# Patient Record
Sex: FEMALE | Race: ASIAN | NOT HISPANIC OR LATINO | Employment: FULL TIME | URBAN - METROPOLITAN AREA
[De-identification: names, ages, dates, MRNs, and addresses within clinical notes are randomized per-mention and may not be internally consistent; named-entity substitution may affect disease eponyms.]

---

## 2019-03-22 ENCOUNTER — HOSPITAL ENCOUNTER (EMERGENCY)
Facility: CLINIC | Age: 19
Discharge: HOME | End: 2019-03-22
Attending: PHYSICIAN ASSISTANT | Admitting: PHYSICIAN ASSISTANT
Payer: MEDICAID

## 2019-03-22 ENCOUNTER — APPOINTMENT (OUTPATIENT)
Dept: GENERAL RADIOLOGY | Facility: CLINIC | Age: 19
End: 2019-03-22
Attending: PHYSICIAN ASSISTANT
Payer: MEDICAID

## 2019-03-22 VITALS
BODY MASS INDEX: 21.73 KG/M2 | HEIGHT: 67 IN | WEIGHT: 138.45 LBS | SYSTOLIC BLOOD PRESSURE: 121 MMHG | OXYGEN SATURATION: 98 % | TEMPERATURE: 98.7 F | DIASTOLIC BLOOD PRESSURE: 76 MMHG | RESPIRATION RATE: 12 BRPM

## 2019-03-22 DIAGNOSIS — R07.9 CHEST PAIN, UNSPECIFIED TYPE: ICD-10-CM

## 2019-03-22 LAB
ANION GAP SERPL CALCULATED.3IONS-SCNC: 3 MMOL/L (ref 3–14)
B-HCG SERPL-ACNC: <1 IU/L (ref 0–5)
BASOPHILS # BLD AUTO: 0 10E9/L (ref 0–0.2)
BASOPHILS NFR BLD AUTO: 0.6 %
BUN SERPL-MCNC: 13 MG/DL (ref 7–30)
CALCIUM SERPL-MCNC: 8.7 MG/DL (ref 8.5–10.1)
CHLORIDE SERPL-SCNC: 108 MMOL/L (ref 96–110)
CO2 SERPL-SCNC: 29 MMOL/L (ref 20–32)
CREAT SERPL-MCNC: 0.61 MG/DL (ref 0.5–1)
D DIMER PPP FEU-MCNC: <0.3 UG/ML FEU (ref 0–0.5)
DIFFERENTIAL METHOD BLD: NORMAL
EOSINOPHIL # BLD AUTO: 0 10E9/L (ref 0–0.7)
EOSINOPHIL NFR BLD AUTO: 0.3 %
ERYTHROCYTE [DISTWIDTH] IN BLOOD BY AUTOMATED COUNT: 12.7 % (ref 10–15)
GFR SERPL CREATININE-BSD FRML MDRD: >90 ML/MIN/{1.73_M2}
GLUCOSE SERPL-MCNC: 82 MG/DL (ref 70–99)
HCT VFR BLD AUTO: 37.9 % (ref 35–47)
HGB BLD-MCNC: 12 G/DL (ref 11.7–15.7)
IMM GRANULOCYTES # BLD: 0 10E9/L (ref 0–0.4)
IMM GRANULOCYTES NFR BLD: 0.1 %
INTERPRETATION ECG - MUSE: NORMAL
LYMPHOCYTES # BLD AUTO: 2.5 10E9/L (ref 0.8–5.3)
LYMPHOCYTES NFR BLD AUTO: 37.2 %
MCH RBC QN AUTO: 27.7 PG (ref 26.5–33)
MCHC RBC AUTO-ENTMCNC: 31.7 G/DL (ref 31.5–36.5)
MCV RBC AUTO: 88 FL (ref 78–100)
MONOCYTES # BLD AUTO: 0.4 10E9/L (ref 0–1.3)
MONOCYTES NFR BLD AUTO: 6.4 %
NEUTROPHILS # BLD AUTO: 3.7 10E9/L (ref 1.6–8.3)
NEUTROPHILS NFR BLD AUTO: 55.4 %
NRBC # BLD AUTO: 0 10*3/UL
NRBC BLD AUTO-RTO: 0 /100
PLATELET # BLD AUTO: 200 10E9/L (ref 150–450)
POTASSIUM SERPL-SCNC: 4 MMOL/L (ref 3.4–5.3)
RBC # BLD AUTO: 4.33 10E12/L (ref 3.8–5.2)
SODIUM SERPL-SCNC: 140 MMOL/L (ref 133–144)
TROPONIN I SERPL-MCNC: <0.015 UG/L (ref 0–0.04)
WBC # BLD AUTO: 6.7 10E9/L (ref 4–11)

## 2019-03-22 PROCEDURE — 85379 FIBRIN DEGRADATION QUANT: CPT | Performed by: PHYSICIAN ASSISTANT

## 2019-03-22 PROCEDURE — 93005 ELECTROCARDIOGRAM TRACING: CPT

## 2019-03-22 PROCEDURE — 36415 COLL VENOUS BLD VENIPUNCTURE: CPT | Performed by: PHYSICIAN ASSISTANT

## 2019-03-22 PROCEDURE — 80048 BASIC METABOLIC PNL TOTAL CA: CPT | Performed by: PHYSICIAN ASSISTANT

## 2019-03-22 PROCEDURE — 99285 EMERGENCY DEPT VISIT HI MDM: CPT | Mod: 25

## 2019-03-22 PROCEDURE — 84484 ASSAY OF TROPONIN QUANT: CPT | Performed by: PHYSICIAN ASSISTANT

## 2019-03-22 PROCEDURE — 84702 CHORIONIC GONADOTROPIN TEST: CPT | Performed by: PHYSICIAN ASSISTANT

## 2019-03-22 PROCEDURE — 85025 COMPLETE CBC W/AUTO DIFF WBC: CPT | Performed by: PHYSICIAN ASSISTANT

## 2019-03-22 PROCEDURE — 71046 X-RAY EXAM CHEST 2 VIEWS: CPT

## 2019-03-22 ASSESSMENT — ENCOUNTER SYMPTOMS
DIZZINESS: 1
ABDOMINAL PAIN: 0
LIGHT-HEADEDNESS: 1
NECK PAIN: 1

## 2019-03-22 ASSESSMENT — MIFFLIN-ST. JEOR: SCORE: 1435.63

## 2019-03-22 NOTE — ED TRIAGE NOTES
Pt has chest pain for past 3 days.  She also has right shoulder pain and pain on right side of neck with shooting pain down right arm.  Pain behind eyes is described as throbbing.

## 2019-03-22 NOTE — ED AVS SNAPSHOT
[facility] Emergency Department  [address]  [facility] [address]  Phone:  [phone]  Fax:  [phone]                                    Ibis Lazar   MRN: [id number]    Department:  [facility] Emergency Department   Date of Visit:  3/22/2019           After Visit Summary Signature Page    I have received my discharge instructions, and my questions have been answered. I have discussed any challenges I see with this plan with the nurse or doctor.    ..........................................................................................................................................  Patient/Patient Representative Signature      ..........................................................................................................................................  Patient Representative Print Name and Relationship to Patient    ..................................................               ................................................  Date                                   Time    ..........................................................................................................................................  Reviewed by Signature/Title    ...................................................              ..............................................  Date                                               Time          22EPIC Rev 08/18

## 2019-03-22 NOTE — ED PROVIDER NOTES
History     Chief Complaint:  Chest Pain    The history is provided by the patient.      Ibis Lazar is a 18 year old female who presents to the emergency department with a significant other for evaluation of chest pain. For the past three days, the patient has had constant, sharp chest pain that wakes her up at night. She also has associated chest tightness, lightheadedness, and dizziness. She denies shortness of breath or leg swelling.  Then today, she had the onset of right shoulder pain and neck pain associated with the chest pain and has had pain shoot down her right arm and make her right hand numbness. She also has been shaking today. The persistent chest pain prompted the patient to seek evaluation here in the emergency department. She denies abdominal pain.     Cardiac/PE/DVT Risk Factors:  The patient denies a history of hypertension, hyperlipidemia, diabetes, and smoking. The patient denies any personal or familial history of PE, DVT, or clotting disorder. The patient denies recent travel, surgery, or other immobilizations.     Allergies:  NKDA     Medications:    The patient is not currently taking any prescribed medications.     Past Medical History:    The patient denies any significant past medical history.    Past Surgical History:    The patient does not have any pertinent past surgical history.    Family History:    No past pertinent family history.    Social History:  Negative for tobacco use.  Negative for alcohol use.  Negative for drug use.     Review of Systems   Cardiovascular: Positive for chest pain. Negative for leg swelling.   Gastrointestinal: Negative for abdominal pain.   Musculoskeletal: Positive for neck pain.   Neurological: Positive for dizziness and light-headedness.   All other systems reviewed and are negative.     Physical Exam     Patient Vitals for the past 24 hrs:   BP Temp Temp src Heart Rate Resp SpO2 Height Weight   03/22/19 1610 121/76 -- -- 71 12 98 % --  "--   03/22/19 1335 111/67 98.7  F (37.1  C) Temporal 69 18 100 % 1.702 m (5' 7\") 62.8 kg (138 lb 7.2 oz)     Physical Exam  Constitutional: well appearing, no acute distress.   Head: No external signs of trauma noted to head or face.   Eyes: Pupils are equal, round, and reactive to light. Conjunctiva normal. EOMI.  ENT: MMM. Oropharynx clear and moist without tonsillar enlargement or exudates. Uvula is midline. Normal voice.   Neck: No lymphadenopathy. Non-tender to palpation. No swelling. Normal ROM.  Cardiovascular: Normal rate, regular rhythm, and intact distal pulses.    Respiratory: Effort normal. No respiratory distress. Lungs clear to auscultation bilaterally. No chest wall tenderness, crepitus, or ecchymosis.  GI: Soft. There is no tenderness.   Musculoskeletal: No deformities appreciated. Normal ROM. No edema noted.  Neurological: Alert and Oriented x 3. Speech normal. Moves all extremities equally. 5/5 strength of bilateral upper extremities. Sensation intact.   Psychiatric: Appropriate mood, affect, and behavior.   Skin: Skin is warm and dry.         Emergency Department Course   ECG:  Indication: chest pain  Time: 1326  Vent. Rate 71 bpm. NE interval 136. QRS duration 78. QT/QTc 386/419. P-R-T axis 69 66 44. Normal sinus rhythm. Normal ECG. Agrees with computer interpretation. Read time: 1329.    Imaging:  Radiographic findings were communicated with the patient who voiced understanding of the findings.    XR Chest 2 views:   Cardiac silhouette and pulmonary vasculature are within  normal limits. No focal airspace disease, pleural effusion or  pneumothorax. As per radiology.     Laboratory:  1455 Troponin: <0.015  D-Dimer: <0.3  CBC: WBC: 6.7, HGB: 12.0, PLT: 200  BMP: All WNL (Creatinine: 0.61)  ISTAT Pregnancy qualitative POCT: <1    Emergency Department Course:  1347 Nursing notes and vitals reviewed.  I performed an exam of the patient as documented above.     Blood drawn. This was sent to the lab " for further testing, results above.    EKG obtained in the ED, see results above.     The patient was sent for a chest xray while in the emergency department, findings above.     1553 I rechecked the patient and discussed the results of her workup thus far.     Findings and plan explained to the Patient. Patient discharged home with instructions regarding supportive care, medications, and reasons to return. The importance of close follow-up was reviewed.    I personally reviewed the laboratory results with the Patient and answered all related questions prior to discharge.   Impression & Plan    Medical Decision Makin year old female presenting with chest pain radiating to right neck and arm. Her EKG is completely normal without evidence of arrhythmia or ischemia. Troponin is negative. With symptoms present constantly for 3 days this is sufficient to rule out ACS in this otherwise low risk patient with atypical symptoms. D-dimer is negative, which is sufficient to rule out PE in an otherwise low risk patient. The remainder of her labs are unrevealing. CXR does not reveal any evidence of pneumonia, pneumothorax, pulmonary edema, or effusion. Low suspicion for aortic dissection at this time. It is possible her arm symptoms are related to a cervical radiculopathy, but she is not currently having those symptoms and she has a completely normal neurologic exam at this time so it seems less likely, but is still possible. Regardless, she does not require imaging at this time. The cause of her pain is not entirely clear at this time. However, with her negative evaluation today and serious pathology ruled out, I think she is appropriate for discharge home with close follow-up in clinic. She was instructed to return to the ED for any new or worsening symptoms, including increasing pain, fever, shortness of breath, or other concerning symptoms.     Critical Care time:  none    Diagnosis:    ICD-10-CM    1. Chest pain,  unspecified type R07.9        Disposition:  discharged to home    Scribe Disclosure:  I, Narda Briana, am serving as a scribe on 3/22/2019 at 1:46 PM to personally document services performed by Tawnya Barron PA-C based on my observations and the provider's statements to me.     Narda Warren  3/22/2019   [facility] EMERGENCY DEPARTMENT       Tawnya Barron PA-C  03/22/19 9939

## 2019-08-31 ENCOUNTER — OFFICE VISIT (OUTPATIENT)
Dept: URGENT CARE | Facility: URGENT CARE | Age: 19
End: 2019-08-31
Payer: COMMERCIAL

## 2019-08-31 VITALS
TEMPERATURE: 98.4 F | HEART RATE: 84 BPM | OXYGEN SATURATION: 98 % | SYSTOLIC BLOOD PRESSURE: 106 MMHG | DIASTOLIC BLOOD PRESSURE: 64 MMHG | BODY MASS INDEX: 20.85 KG/M2 | WEIGHT: 133.1 LBS

## 2019-08-31 DIAGNOSIS — J02.9 SORE THROAT: ICD-10-CM

## 2019-08-31 DIAGNOSIS — J02.9 VIRAL PHARYNGITIS: Primary | ICD-10-CM

## 2019-08-31 LAB
DEPRECATED S PYO AG THROAT QL EIA: NORMAL
SPECIMEN SOURCE: NORMAL

## 2019-08-31 PROCEDURE — 87081 CULTURE SCREEN ONLY: CPT | Performed by: PHYSICIAN ASSISTANT

## 2019-08-31 PROCEDURE — 99203 OFFICE O/P NEW LOW 30 MIN: CPT | Performed by: PHYSICIAN ASSISTANT

## 2019-08-31 PROCEDURE — 87880 STREP A ASSAY W/OPTIC: CPT | Performed by: PHYSICIAN ASSISTANT

## 2019-08-31 RX ORDER — ACETAMINOPHEN 500 MG
500-1000 TABLET ORAL EVERY 6 HOURS PRN
COMMUNITY

## 2019-08-31 RX ORDER — OMEGA-3 FATTY ACIDS/FISH OIL 300-1000MG
200 CAPSULE ORAL EVERY 4 HOURS PRN
COMMUNITY

## 2019-08-31 NOTE — PATIENT INSTRUCTIONS
Patient Education     Viral Pharyngitis (Sore Throat)    You or your child have pharyngitis (sore throat). This infection is caused by a virus. It can cause throat pain that is worse when swallowing, aching all over, headache, and fever. The infection may be spread by coughing, kissing, or touching others after touching your mouth or nose. Antibiotic medicines do not work against viruses. They are not used for treating this illness.  Home care    If symptoms are severe, you or your child should rest at home. Return to work or school when you or your child feel well enough.     You or your child should drink plenty of fluids to prevent dehydration.    Use throat lozenges or numbing throat sprays to help reduce pain. Gargling with warm salt water will also help reduce throat pain. Dissolve 1/2 teaspoon of salt in 1 glass of warm water. Children can sip on juice or a popsicle. Children 5 years and older can also suck on a lollipop or hard candy.    Don t eat salty or spicy foods or give them to your child. These can be irritating to the throat.  Medicines for a child: You can give your child acetaminophen for fever, fussiness, or discomfort. In babies over 6 months of age, you may use ibuprofen instead of acetaminophen. If your child has chronic liver or kidney disease or ever had a stomach ulcer or GI bleeding, talk with your child s healthcare provider before giving these medicines. Aspirin should never be used by any child under 18 years of age who has a fever. It may cause severe liver damage.  Medicines for an adult: You may use acetaminophen or ibuprofen to control pain or fever, unless another medicine was prescribed for this. If you have chronic liver or kidney disease or ever had a stomach ulcer or GI bleeding, talk with your healthcare provider before using these medicines.  Follow-up care  Follow up with a healthcare provider or our staff if you or your child are not getting better over the next  week.  When to seek medical advice  Call your healthcare provider right away if any of these occur:    Fever as directed by your healthcare provider.  For children, seek care if:  ? Your child is of any age and has repeated fevers above 104 F (40 C).  ? Your child is younger than 2 years of age and has a fever of 100.4 F (38 C) for more than 1 day.  ? Your child is 2 years old or older and has a fever of 100.4 F (38 C) for more than 3 days.    New or worsening ear pain, sinus pain, or headache    Painful lumps in the back of neck    Stiff neck    Lymph nodes are getting larger    Can t swallow liquids, a lot of drooling, or can t open mouth wide due to throat pain    Signs of dehydration, such as very dark urine or no urine, sunken eyes, dizziness    Trouble breathing or noisy breathing    Muffled voice    New rash    Other symptoms are getting worse  Date Last Reviewed: 10/1/2017    0254-2857 [employer] [facility]. [address] [place] [address], [place] [address]. All rights reserved. This information is not intended as a substitute for professional medical care. Always follow your healthcare professional's instructions.

## 2019-08-31 NOTE — PROGRESS NOTES
SUBJECTIVE:    Ibis Lazar  presents to clinic today for evaluation of ST and tender neck glands (wihtout swelling), sinus congestion  and slight right sided ear pain  x 2 days duration.       ROS:     HEENT: Positive as per above   RESP:Denies any cough, wheezing or SOB   GI: Denies any vomiting or diarrhea.No abdominal pain. Normal BM's  SKIN: Denies rash  NEURO: No stiff neck, HA or lethargy     PMHX: Denies any significant PMHX     Current Outpatient Medications   Medication     acetaminophen (TYLENOL) 500 MG tablet     ibuprofen (ADVIL/MOTRIN) 200 MG capsule     No current facility-administered medications for this visit.      No Known Allergies      OBJECTIVE:  /64 (BP Location: Right arm, Patient Position: Chair, Cuff Size: Adult Regular)   Pulse 84   Temp 98.4  F (36.9  C) (Oral)   Wt 60.4 kg (133 lb 1.6 oz)   SpO2 98%   BMI 20.85 kg/m          General appearance: alert and no apparent distress  Skin color is uniform in color and without rash.  HEENT:   Conjunctiva not injected.  Sclera clear.  Left TM is normal: no effusions, no erythema, and normal landmarks.  Right TM is normal: no effusions, no erythema, and normal landmarks.  Nasal mucosa is congested   Oropharyngeal exam is positive for mild, diffuse, erythema.  No plaque, exudate, lesions, or ulcers.   Neck is supple, FROM. No pain or stiffness with ROM. No adenopathy  CARDIAC:NORMAL - regular rate and rhythm without murmur.  RESP: Normal - CTA without rales, rhonchi, or wheezing.  NEURO: Alert and oriented.  Normal speech and mentation.  CN II/XII grossly intact.  Gait within normal limits.          LABS:     Results for orders placed or performed in visit on 08/31/19   Strep, Rapid Screen   Result Value Ref Range    Specimen Description Throat     Rapid Strep A Screen       NEGATIVE: No Group A streptococcal antigen detected by immunoassay, await culture report.     ASSESSMENT/PLAN:    (J02.9) Viral pharyngitis  (primary  "encounter diagnosis)    Comment: RST Negative. No evidence of secondary bacterial infection. Very reassuring exam today.     Plan: follow-up with PCP  if sxs change, worsen or fail to resolve with home comfort care measures over the next 5-7 days.    In addition to the above, viral URI \"red flag\" signs and sxs are reviewed with parent both verbally and by way of printed educational material for home review.  Parent verbalizes understanding of and agrees to the above plan.     (J02.9) Sore throat  Plan: Strep, Rapid Screen, Beta strep group A culture                "

## 2019-09-01 LAB
BACTERIA SPEC CULT: NORMAL
SPECIMEN SOURCE: NORMAL

## 2020-03-11 ENCOUNTER — HEALTH MAINTENANCE LETTER (OUTPATIENT)
Age: 20
End: 2020-03-11

## 2021-01-03 ENCOUNTER — HEALTH MAINTENANCE LETTER (OUTPATIENT)
Age: 21
End: 2021-01-03

## 2021-02-04 ENCOUNTER — OFFICE VISIT (OUTPATIENT)
Dept: URGENT CARE | Facility: URGENT CARE | Age: 21
End: 2021-02-04
Payer: COMMERCIAL

## 2021-02-04 ENCOUNTER — ANCILLARY PROCEDURE (OUTPATIENT)
Dept: GENERAL RADIOLOGY | Facility: CLINIC | Age: 21
End: 2021-02-04
Attending: PHYSICIAN ASSISTANT

## 2021-02-04 VITALS
HEART RATE: 78 BPM | SYSTOLIC BLOOD PRESSURE: 120 MMHG | OXYGEN SATURATION: 98 % | DIASTOLIC BLOOD PRESSURE: 78 MMHG | RESPIRATION RATE: 20 BRPM | TEMPERATURE: 98.2 F

## 2021-02-04 DIAGNOSIS — M70.51 PATELLAR BURSITIS OF RIGHT KNEE: ICD-10-CM

## 2021-02-04 DIAGNOSIS — M25.561 ACUTE PAIN OF RIGHT KNEE: Primary | ICD-10-CM
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
## 2021-02-04 PROCEDURE — 99214 OFFICE O/P EST MOD 30 MIN: CPT | Performed by: PHYSICIAN ASSISTANT

## 2021-02-04 PROCEDURE — 73562 X-RAY EXAM OF KNEE 3: CPT | Mod: RT | Performed by: RADIOLOGY

## 2021-02-04 RX ORDER — NAPROXEN 500 MG/1
500 TABLET ORAL 2 TIMES DAILY WITH MEALS
Qty: 14 TABLET | Refills: 0 | Status: SHIPPED | OUTPATIENT
Start: 2021-02-04 | End: 2021-02-11

## 2021-02-04 NOTE — PROGRESS NOTES
Ibis Lazar is a 20 year old female who presents to  today, accompanied by her significant other, for evaluation of right knee pain related to an injury she sustained at work.     HPI: Patient states she worked at [employer]. While at work yesterday (2/3/21), at approximately 11 am, she injured her right knee. Patient states another worker had knee height refrigerator door open--patient was walking briskly and did not see it open--describes forcefully hitting right kneecap on edge of open refrigerator door.     Patient states she tried to return to work yesterday, but was sent home due to her acute knee pain. Patient now points to entire patella area as site of pain. Pain is now rated at constant 7/10 pain sitting and worse with activities such as walking and climbing stairs.     Home Care: Patient has tried Ibuprofen without any reduction of pain      ROS:   CONSITUTIONAL: Denies any fever or chills   MUS/SKEL: Positive as her above   RHEUM: Denies any hx of inflammatory arthritis   ENDOCRINE: No history of diabetes.    SKIN: Bruising over right kneecap. No lacerations     OBJECTIVE:  /78   Pulse 78   Temp 98.2  F (36.8  C) (Tympanic)   Resp 20   SpO2 98%     GENERAL:  Very pleasant, comfortable at rest. NAD.   RIGHT KNEE: Positive for faint bruising proximal patella. Otherwise unremarkable on inspection.  No obvious deformity. No obvious patellar dislocation. No redness or swelling.  Positive antalgic gait, negative drawer sign, collateral ligaments intact, negative Virgil test.  Exam of lower leg unremarkable. No redness, tenderness, or swelling.  Both lower legs equal in circumference bilaterally.       XR RIGHT KNEE:     I reviewed my impression (No patellar dislocation. No acute fracture. No acute findings), along with actual x-ray images, with patient during today's office visit.  Radiologist over-read pending. Patient will need to be called if there are any acute findings  on radiologist over-read.       ASSESSMENT:      (M25.964) Acute pain of right knee  (primary encounter diagnosis)    MDM: Direct blow to right patella mechanism of injury at work as per HPI above.  Due to her high level of reported pain, I considered partial dislocation of patella and patellar fracture etiologies. X-ray negative for both, on my read today. At this point, my highest medical suspicion is that her pain is related to prepatellar bursitis.  She was offered, but, declined patellar knee sleeve here today.  I have advised trial of conservative management, as outlined below. We discussed that she should follow-up with primary care provider or orthopedic provider if she is having ongoing pain that prevents or limits her ability to work next week. I provided letter for her to be excused from work x 4 days. Any further work absence or restrictions will be as per PCP or ortho MD.      Please see below patient discharge summary--that I reviewed verbally and provided in printed form today for home review.      Plan: XR Knee Right 3 Views, Orthopedic & Spine          Referral, naproxen (NAPROSYN) 500 MG         tablet            February 4, 2021 Urgent Care Plan:     1. Start the anti-inflammatory medication Naproxen I prescribed for you today (please do not take Ibuprofen or Aleve while taking this medication).     2. I provided a note for you to be off work this weekend. Please use this time to rest, elevate and Ice your knee.     3. If you are unable to return to full work Monday (February 8, 2021), you should be re-evaluated by your primary care provider or an orthopedic doctor.     I did provide an orthopedic referral for you today.     4. Please watch your MyChart for your final radiologist X-Ray report documentation.     5. Follow-up immediately if you develop any of the below:       Fever of 100.4 F (38 C) or higher, or as directed by your provider    Chills    Increased swelling or warmth of the  area, or drainage from the area    Sudden, severe, worsening     (M70.51) Patellar bursitis of right knee  Plan: naproxen (NAPROSYN) 500 MG tablet      (Y99.0) Work related injury  Plan: XR Knee Right 3 Views, Orthopedic & Spine          Referral, naproxen (NAPROSYN) 500 MG         tablet

## 2021-02-04 NOTE — PATIENT INSTRUCTIONS
Patient Education     Understanding Prepatellar Bursitis     A bursa is a thin, slippery, sac-like film. It contains a small amount of fluid. This structure is found between bones and soft tissues in and around joints. A bursa cushions and protects a joint. It keeps parts of a joint from rubbing against each other.   The prepatellar bursa is found on top of the kneecap (patella). It lies just under the skin. If this bursa becomes irritated and inflamed, the condition is called prepatellar bursitis.   Causes of prepatellar bursitis   A common cause of this problem is repeated kneeling on the floor. For this reason, it is sometimes called  s knee. Workers such as plumbers, carpet layers, roofers, and gardeners are at risk. Injury from a blow to your kneecap can also cause it. Athletes in sports such as football and wrestling are at a greater risk. Running on uneven ground may also play a role.   Symptoms of prepatellar bursitis   These may include:    Knee pain that gets worse with bending or pressure to the knee and gets better with rest    Swelling over the kneecap    Tenderness or warmth over the kneecap    Crackling sound from the kneecap with movement  Treatment for prepatellar bursitis   This problem often gets better with rest and medicines. Other treatments may be needed. Possible treatments include:     Resting your knee. This allows the area to heal. It includes avoiding things that trigger symptoms.    Prescription or over-the-counter medicines. These help reduce pain and swelling. NSAIDs (nonsteroidal anti-inflammatory drugs) are the most common medicines used. Medicines may be prescribed or bought over the counter. They may be given as pills. Or they may be put on the skin as a gel, cream, or patch.    Stretching and strengthening exercises. These help improve the strength and flexibility of the muscles around the knee.    Cold packs or heat packs. These help reduce pain and  swelling.    Physical therapy. This may include exercises, ultrasound, or other treatments.    Kneepads. These help protect your knees during sports.    Injection of medicine into the bursa or drainage of fluid from the bursa. These may help relieve symptoms. The medicine is usually a corticosteroid. This is a strong anti-inflammatory medicine.  For symptoms that don t get better with these treatments, surgery to remove the bursa may help.   Possible complications     If germs get into the bursa through a cut in your skin, the bursa may become infected. An infection is generally treated with antibiotic medicine. In some cases, the infected bursa must be removed.    If your knee isn t given time to heal, this problem may become long-term (chronic). This can lead to trouble moving the knee joint.    When to call your healthcare provider   Call your healthcare provider right away if you have:     Fever of 100.4 F (38 C) or higher, or as directed by your provider    Chills    Increased swelling or warmth of the area, or drainage from the area    Symptoms that don t get better or get worse    New symptoms  [employer] last reviewed this educational content on 6/1/2019 2000-2020 [employer]. [address] [place] [address]. All rights reserved. This information is not intended as a substitute for professional medical care. Always follow your healthcare professional's instructions.             February 4, 2021 Urgent Care Plan:     1. Start the anti-inflammatory medication Naproxen I prescribed for you today (please do not take Ibuprofen or Aleve while taking this medication).     2. I provided a note for you to be off work this weekend. Please use this time to rest, elevate and Ice your knee.     3. If you are unable to return to full work Monday (February 8, 2021), you should be re-evaluated by your primary care provider or an orthopedic doctor.     I did provide an orthopedic referral for you  today.     4. Please watch your MyChart for your final radiologist X-Ray report documentation.     5. Follow-up immediately if you develop any of the below:       Fever of 100.4 F (38 C) or higher, or as directed by your provider    Chills    Increased swelling or warmth of the area, or drainage from the area    Sudden, severe, worsening

## 2021-02-04 NOTE — LETTER
[facility] [place]  [address]  [address]  [place] [address]  [phone]          February 4, 2021    RE:  Ibis Lazar                                                                                                                                                       [address]   [place] [address]            To whom it may concern:    Ibis Lazar was seen here today for evaluation of an acute medical injury. Please excuse her from work through Sunday 2/7/21.     If she is unable to return to full work Monday 2/8/21, she will be re-evaluated by her primary care provider or by an orthopedic specialist. Any further work absence and/or work restrictions will be as per primary care provider or her orthopedic specialist.       Sincerely,        Parker Barton PA-C

## 2021-04-25 ENCOUNTER — HEALTH MAINTENANCE LETTER (OUTPATIENT)
Age: 21
End: 2021-04-25

## 2021-04-28 ENCOUNTER — OFFICE VISIT (OUTPATIENT)
Dept: URGENT CARE | Facility: URGENT CARE | Age: 21
End: 2021-04-28
Payer: COMMERCIAL

## 2021-04-28 VITALS
HEART RATE: 80 BPM | DIASTOLIC BLOOD PRESSURE: 68 MMHG | TEMPERATURE: 98.9 F | RESPIRATION RATE: 20 BRPM | SYSTOLIC BLOOD PRESSURE: 103 MMHG | OXYGEN SATURATION: 98 %

## 2021-04-28 DIAGNOSIS — J02.9 SORE THROAT: Primary | ICD-10-CM

## 2021-04-28 LAB
DEPRECATED S PYO AG THROAT QL EIA: NEGATIVE
SARS-COV-2 RNA RESP QL NAA+PROBE: NORMAL
SPECIMEN SOURCE: NORMAL
SPECIMEN SOURCE: NORMAL

## 2021-04-28 PROCEDURE — 87651 STREP A DNA AMP PROBE: CPT | Performed by: FAMILY MEDICINE

## 2021-04-28 PROCEDURE — U0003 INFECTIOUS AGENT DETECTION BY NUCLEIC ACID (DNA OR RNA); SEVERE ACUTE RESPIRATORY SYNDROME CORONAVIRUS 2 (SARS-COV-2) (CORONAVIRUS DISEASE [COVID-19]), AMPLIFIED PROBE TECHNIQUE, MAKING USE OF HIGH THROUGHPUT TECHNOLOGIES AS DESCRIBED BY CMS-2020-01-R: HCPCS | Performed by: FAMILY MEDICINE

## 2021-04-28 PROCEDURE — 99213 OFFICE O/P EST LOW 20 MIN: CPT | Performed by: PHYSICIAN ASSISTANT

## 2021-04-28 PROCEDURE — U0005 INFEC AGEN DETEC AMPLI PROBE: HCPCS | Performed by: FAMILY MEDICINE

## 2021-04-28 PROCEDURE — 99N1174 PR STATISTIC STREP A RAPID: Performed by: FAMILY MEDICINE

## 2021-04-28 RX ORDER — PREDNISONE 20 MG/1
20 TABLET ORAL DAILY
Qty: 3 TABLET | Refills: 0 | Status: SHIPPED | OUTPATIENT
Start: 2021-04-28 | End: 2021-05-01

## 2021-04-28 NOTE — PROGRESS NOTES
Assessment & Plan     1. Sore throat  Lungs are CTAB, no sign of respiratory distress. Throat without PTA or RPA and TM clear B/L. No nuchal rigidity or abd tenderness.  Suspect viral URI. Will hold off on mono and CBC testing at this time, patient has a fear of needles.   Prednisone for lymph node swelling. OK to take tylenol for comfort.   - Streptococcus A Rapid Scr w Reflx to PCR  - Symptomatic COVID-19 Virus (Coronavirus) by PCR  - Group A Streptococcus PCR Throat Swab  - predniSONE (DELTASONE) 20 MG tablet; Take 1 tablet (20 mg) by mouth daily for 3 days  Dispense: 3 tablet; Refill: 0  56}    Return in about 4 days (around 5/2/2021), or if symptoms worsen or fail to improve.    Diagnosis and treatment plan was reviewed with patient and/or family.   We went over any labs or imaging. Discussed worsening symptoms or little to no relief despite treatment plan to follow-up with PCP or return to clinic.  Patient verbalizes understanding. All questions were addressed and answered.     Narda Ascencio PA-C  [facility] [place]    CHIEF COMPLAINT:   Chief Complaint   Patient presents with     Urgent Care     Pharyngitis     sore throat/cough      Subjective     Ibis is a 21 year old female who presents to clinic today for evaluation sore throat. Started approximately one week ago. Sore throat has worsened in the past 2 days. Endorses cough and intermittent fever and body aches. No chest pain or SOB.       History reviewed. No pertinent past medical history.  History reviewed. No pertinent surgical history.  Social History     Tobacco Use     Smoking status: Current Every Day Smoker     Types: Other     Smokeless tobacco: Never Used   Substance Use Topics     Alcohol use: Yes     Comment: rare     Current Outpatient Medications   Medication     acetaminophen (TYLENOL) 500 MG tablet     ibuprofen (ADVIL/MOTRIN) 200 MG capsule     predniSONE (DELTASONE) 20 MG tablet     No current  facility-administered medications for this visit.      No Known Allergies    10 point ROS of systems were all negative except for pertinent positives noted in my HPI.      Exam:  /68   Pulse 80   Temp 98.9  F (37.2  C)   Resp 20   SpO2 98%   Constitutional: healthy, alert and no distress  Head: Normocephalic, atraumatic.  Eyes: conjunctiva clear, no drainage  ENT: TMs clear and shiny nolan, nasal mucosa pink and moist, throat with erythema and cobblestoning. No trismus or drooling.   Neck: neck is supple, + cervical lymphadenopathy or nuchal rigidity  Cardiovascular: RRR  Respiratory: CTA bilaterally, no rhonchi or rales  Gastrointestinal: soft and nontender  Skin: no rashes  Neurologic: Speech clear, gait normal. Moves all extremities.    Results for orders placed or performed in visit on 04/28/21   Streptococcus A Rapid Scr w Reflx to PCR     Status: None    Specimen: Throat   Result Value Ref Range    Strep Specimen Description Throat     Streptococcus Group A Rapid Screen Negative NEG^Negative

## 2021-04-28 NOTE — LETTER
[facility] [place]  [address]  [address]  [place] [address]  Phone: [phone]  Fax: [phone]    April 28, 2021        Ibis Lazar  [address]   [place] [address]          To whom it may concern:    RE: Ibis Lazar    Patient was seen and treated today at our clinic. Please excuse patient from work 04/29/2021.     Please contact me for questions or concerns.      Sincerely,        Narda Ascencio PA-C

## 2021-04-29 LAB
LABORATORY COMMENT REPORT: NORMAL
SARS-COV-2 RNA RESP QL NAA+PROBE: NEGATIVE
SPECIMEN SOURCE: NORMAL
SPECIMEN SOURCE: NORMAL
STREP GROUP A PCR: NOT DETECTED

## 2021-05-02 ENCOUNTER — ANCILLARY PROCEDURE (OUTPATIENT)
Dept: GENERAL RADIOLOGY | Facility: CLINIC | Age: 21
End: 2021-05-02
Attending: FAMILY MEDICINE
Payer: COMMERCIAL

## 2021-05-02 ENCOUNTER — OFFICE VISIT (OUTPATIENT)
Dept: URGENT CARE | Facility: URGENT CARE | Age: 21
End: 2021-05-02
Payer: COMMERCIAL

## 2021-05-02 VITALS
SYSTOLIC BLOOD PRESSURE: 121 MMHG | WEIGHT: 125 LBS | TEMPERATURE: 98.2 F | DIASTOLIC BLOOD PRESSURE: 87 MMHG | HEART RATE: 93 BPM | OXYGEN SATURATION: 98 % | BODY MASS INDEX: 19.58 KG/M2

## 2021-05-02 DIAGNOSIS — J01.90 ACUTE SINUSITIS WITH COEXISTING CONDITION REQUIRING PROPHYLACTIC TREATMENT: ICD-10-CM

## 2021-05-02 DIAGNOSIS — Z20.822 SUSPECTED COVID-19 VIRUS INFECTION: ICD-10-CM

## 2021-05-02 DIAGNOSIS — R05.9 COUGH: Primary | ICD-10-CM

## 2021-05-02 LAB
BASOPHILS # BLD AUTO: 0 10E9/L (ref 0–0.2)
BASOPHILS NFR BLD AUTO: 0.4 %
DIFFERENTIAL METHOD BLD: NORMAL
EOSINOPHIL # BLD AUTO: 0.1 10E9/L (ref 0–0.7)
EOSINOPHIL NFR BLD AUTO: 1.4 %
ERYTHROCYTE [DISTWIDTH] IN BLOOD BY AUTOMATED COUNT: 12.7 % (ref 10–15)
ERYTHROCYTE [SEDIMENTATION RATE] IN BLOOD BY WESTERGREN METHOD: 6 MM/H (ref 0–20)
HCT VFR BLD AUTO: 42.1 % (ref 35–47)
HGB BLD-MCNC: 13.8 G/DL (ref 11.7–15.7)
LYMPHOCYTES # BLD AUTO: 2.7 10E9/L (ref 0.8–5.3)
LYMPHOCYTES NFR BLD AUTO: 35.3 %
MCH RBC QN AUTO: 29.1 PG (ref 26.5–33)
MCHC RBC AUTO-ENTMCNC: 32.8 G/DL (ref 31.5–36.5)
MCV RBC AUTO: 89 FL (ref 78–100)
MONOCYTES # BLD AUTO: 0.5 10E9/L (ref 0–1.3)
MONOCYTES NFR BLD AUTO: 6.3 %
NEUTROPHILS # BLD AUTO: 4.4 10E9/L (ref 1.6–8.3)
NEUTROPHILS NFR BLD AUTO: 56.6 %
PLATELET # BLD AUTO: 196 10E9/L (ref 150–450)
RBC # BLD AUTO: 4.75 10E12/L (ref 3.8–5.2)
WBC # BLD AUTO: 7.7 10E9/L (ref 4–11)

## 2021-05-02 PROCEDURE — 71046 X-RAY EXAM CHEST 2 VIEWS: CPT | Performed by: RADIOLOGY

## 2021-05-02 PROCEDURE — 36415 COLL VENOUS BLD VENIPUNCTURE: CPT | Performed by: FAMILY MEDICINE

## 2021-05-02 PROCEDURE — 85025 COMPLETE CBC W/AUTO DIFF WBC: CPT | Performed by: FAMILY MEDICINE

## 2021-05-02 PROCEDURE — 99214 OFFICE O/P EST MOD 30 MIN: CPT | Performed by: FAMILY MEDICINE

## 2021-05-02 PROCEDURE — 85652 RBC SED RATE AUTOMATED: CPT | Performed by: FAMILY MEDICINE

## 2021-05-02 RX ORDER — ALBUTEROL SULFATE 90 UG/1
2 AEROSOL, METERED RESPIRATORY (INHALATION) EVERY 6 HOURS PRN
Qty: 18 G | Refills: 0 | Status: SHIPPED | OUTPATIENT
Start: 2021-05-02

## 2021-05-02 RX ORDER — BENZONATATE 200 MG/1
200 CAPSULE ORAL 3 TIMES DAILY PRN
Qty: 30 CAPSULE | Refills: 0 | Status: SHIPPED | OUTPATIENT
Start: 2021-05-02

## 2021-05-02 RX ORDER — CODEINE PHOSPHATE AND GUAIFENESIN 10; 100 MG/5ML; MG/5ML
2 SOLUTION ORAL EVERY 6 HOURS PRN
Qty: 118 ML | Refills: 0 | Status: SHIPPED | OUTPATIENT
Start: 2021-05-02

## 2021-05-02 RX ORDER — DOXYCYCLINE 100 MG/1
100 CAPSULE ORAL 2 TIMES DAILY
Qty: 20 CAPSULE | Refills: 0 | Status: SHIPPED | OUTPATIENT
Start: 2021-05-02 | End: 2021-05-12

## 2021-05-02 NOTE — LETTER
May 2, 2021      Ibis Lazar  [address]   [place] [address]        To Whom It May Concern:    Ibis Lazar  was seen on 5/2.  Please excuse her from work  due to illness, COVID suspect infection.  She will need to be off work 4/27 - 5/6.        Sincerely,        Leandro Truong MD

## 2021-05-02 NOTE — PATIENT INSTRUCTIONS
"Okay to take ibuprofen 200 mg - 4 tablets (800 mg) every 8 hours as needed.  Okay to take tylenol 500 mg - 2 tablets (1000 mg) every 6-8 hours as needed, do not exceed 3000 mg in 24 hours.  Take doxycycline for sinus infection, this will also cover for \"bronchitis\"  Take tessalon perles to help with cough  Consider robitussin with codeine to help with cough, this will cause drowsiness  Use albuterol inhaler to help with cough, wheezing or shortness of breath      Patient Education     Understanding COVID-19 (Coronavirus Disease 2019)  COVID-19 is an illness of the lungs. It causes fever, coughing and trouble breathing. The illness is caused by a new virus in the coronavirus family called SARS-CoV-2.  First seen in late 2019, this virus has spread to many cities and countries around the world. Scientists are working to understand it better.      To help prevent spreading the infection, wash your hands often, or use an alcohol-based hand .   For the latest information, visit the CDC website at www.cdc.gov/coronavirus/2019-ncov. Or call 175-IJJ-NFQN (094-971-7731).   How does COVID-19 spread?  The virus seems to spread and infect people fairly easily. It may spread by:    Breathing in droplets of fluid that someone coughs or sneezes into the air.    Touching your eyes, nose or mouth after touching an infected surface. (The virus can live on handles, objects, and other surfaces.)  What are the symptoms of COVID-19?  Some people have no symptoms or only mild symptoms. Symptoms can vary from person to person. As experts learn more about COVID-19, new symptoms are being reported.  Symptoms may appear 2 to 14 days after contact with the virus. They include:    Fever or chills    Coughing    Trouble breathing or feeling short of breath    Sore throat    Stuffy or runny nose    Headache and body aches    Fatigue (feeling very tired)    Nausea or vomiting (feeling sick to your stomach or throwing up)    Diarrhea " (loose, watery poop)    Abdominal (belly) pain    Loss of smell or taste  You can check your symptoms with the [facility] s Coronavirus Self-.   What are possible problems from COVID-19?  In many cases, this virus can cause infection (pneumonia) in both lungs. This can make a person very ill, and it can even cause death.  Your chances of severe illness are higher if:    You re an older adult    You have a serious health issue, such as heart or lung disease, diabetes or kidney disease    You have a health problem (or take a medicine) that suppresses the immune system  Rarely, some children develop a severe problem called MIS-C. This is similar to [place] disease, which causes blood vessels and body organs to be inflamed. We don t yet know if MIS-C happens only in children, or if adults are also at risk. We also don t know if it's related to COVID-19--many children with MIS-C test positive for the virus, but not all.  Experts continue to study MIS-C. The [facility] has asked hospitals to report any person under 21 who is ill enough to be in the hospital and has all of the following:    A fever over 100.4 F (38.0 C) for more than 24 hours and either a positive COVID-19 test or exposure to the virus in the last 4 weeks    Inflammation in at least 2 organs such as the heart, lungs or kidneys, with lab tests that show inflammation    No other diagnoses besides COVID-19 explain the child's symptoms  How is COVID-19 diagnosed?  Your care team will ask about your symptoms, recent travel and contact with sick people.  Not everyone will be tested for the virus. Tests that check for current COVID-19 infection include:    Viral (PCR) tests. Viral tests are very accurate. Testers may use a cotton swab to take a sample of cells from your nose and throat, or they may take a sample of your saliva (spit). Some tests can be done at home, while others are done at testing sites. Depending on the test, results might come back in about 30  minutes, or they may take several days (because they must be sent to a lab). Home test kits are now available in some areas, often with prescription. If you use a home kit, follow the instructions closely.    Antigen tests. Testers may use a cotton swab to take a sample of cells from your nose and throat. Depending on the test, some results are back within an hour. This test is good at finding COVID-19, but it sometimes shows that someone has the virus when they don t (false positive), especially in places where few people have the virus. Antigen tests are more likely to miss a COVID-19 infection than a viral test. If your antigen test is negative (shows you don t have the virus), but you have symptoms of COVID-19, your care team may order a viral test.  You may have other tests as well, such as:    Antibody (blood test).  This test may show if you ve had the virus in the past--it won t tell us if you have it right now. (It takes a few weeks for the antibodies to show up in your blood). If you ve had the virus, you may have immunity (protection from the virus) in the future. We don t know yet how long you would be immune. Antibody tests aren t always accurate.    Sputum test (we may collect mucus that you have coughed up from your lungs).    Chest X-ray or CT scan.  Note about immunity  We don t yet know if people can catch COVID-19 more than once. If a person who has fully recovered is re-tested within 3 months, the test may still show low levels of the virus in their body. (In other words, the test may show that they still have COVID-19, even though they aren t spreading it to others.)  This doesn't mean they can't catch COVID-19 again. They may be protected from the virus for a time, but we don t know how long immunity might last.  How is COVID-19 treated?  The FDA has approved a vaccine to prevent COVID-19 in people 16 years and older who are not pregnant or breastfeeding. It's not yet available to the entire  public. The first people to get the vaccine are healthcare staff and those living in long-term care facilities. The vaccine is given as a shot (injection) in the arm muscle. Two doses are needed. The second dose is given several weeks after the first.  For those who have COVID-19, the most proven treatment is to support your body while it fights the virus.    Getting extra rest.    Drink extra fluids (6 to 8 glasses of liquids each day), unless a doctor has told you not to. Ask your care team which fluids are best for you. Avoid fluids that contain caffeine or alcohol.    Take over-the-counter (OTC) pain medicine to reduce pain and fever. Your care team will tell you which medicine to use.  If you are very sick, you may need to stay in the hospital. Your care may include:    IV (intravenous) fluids. We may give you fluids through a needle in your vein to keep hydrated..    Oxygen. To make sure your body gets enough oxygen, we may give you an oxygen mask or a breathing machine (ventilator).    Prone positioning. We may turn you onto your stomach. This will increase the oxygen in your lungs.    Remdesivir. We may give you a medicine through a vein (IV medicine) called remdesivir. It works by stopping the spread of the virus in the body. It's FDA-approved for people being treated in the hospital. This medicine is for those 12 years and older who weigh more than about 88 pounds (40 kgs). In certain cases, it may also be used for people younger than 12 years or who weigh less than about 88 pounds (40 kgs).  Experts are researching experimental treatments as well. These include:    COVID-19 convalescent plasma. Those who have recovered from COVID-19 may be asked to donate plasma. The plasma may contain antibodies to help fight the virus in others. Experts don't know if the plasma will work well as a treatment. Research continues, and the FDA has approved it for emergency use in certain people with serious or  life-threatening COVID-19. For more information, talk to your care team.    Bamlanivimab. The FDA recently approved this treatment (monoclonal antibody therapy) for emergency use for certain people who have COVID-19 but are not in the hospital. It's not widely available and is still being investigated. It s approved for people 12 years and older who weigh about 88 pounds (40 kgs) and are at high risk for severe COVID-19 and a hospital stay. This includes people who are 65 years or older and people with certain chronic conditions.  Are you at risk for COVID-19?  Some studies suggest that people without symptoms may spread COVID-19.  You re at risk for getting COVID-19 if:    You live in (or recently traveled to) an area with a COVID-19 outbreak.    You had close contact with someone who has or may have COVID-19. Close contact means being within 6 feet for a total of 15 minutes or more. This includes several short contacts that add up to at least 15 minutes over a 24-hour period.  Date last modified: 1/15/2021  Gloria last reviewed this educational content on 1/1/2020  This information has been modified by your health care provider with permission from the publisher.    9116-7957 [employer]. [address] [place] [address], [place] [id number]. All rights reserved. This information is not intended as a substitute for professional medical care. Always follow your healthcare professional's instructions.           Patient Education     Sinusitis (Antibiotic Treatment)    The sinuses are air-filled spaces within the bones of the face. They connect to the inside of the nose. Sinusitis is an inflammation of the tissue that lines the sinuses. Sinusitis can occur during a cold. It can also happen due to allergies to pollens and other particles in the air. Sinusitis can cause symptoms of sinus congestion and a feeling of fullness. A sinus infection causes fever, headache, and facial pain. There is often green or yellow  fluid draining from the nose or into the back of the throat (post-nasal drip). You have been given antibiotics to treat this condition.   Home care    Take the full course of antibiotics as instructed. Don't stop taking them, even when you feel better.    Drink plenty of water, hot tea, and other liquids as directed by the healthcare provider. This may help thin nasal mucus. It also may help your sinuses drain fluids.    Heat may help soothe painful areas of your face. Use a towel soaked in hot water. Or,  the shower and direct the warm spray onto your face. Using a vaporizer along with a menthol rub at night may also help soothe symptoms.     An expectorant with guaifenesin may help thin nasal mucus and help your sinuses drain fluids. Talk with your provider or pharmacists before taking an over-the-counter (OTC) medicine if you have any questions about it or its side effects..    You can use an OTC decongestant, unless a similar medicine was prescribed to you. Nasal sprays work the fastest. Use one that contains phenylephrine or oxymetazoline. First blow your nose gently. Then use the spray. Don't use these medicines more often than directed on the label. If you do, your symptoms may get worse. You may also take pills that contain pseudoephedrine. Don t use products that combine multiple medicines. This is because side effects may be increased. Read labels. You can also ask the pharmacist for help. (People with high blood pressure should not use decongestants. They can raise blood pressure.) Talk with your provider or pharmacist if you have any questions about the medicine..    OTC antihistamines may help if allergies contributed to your sinusitis. Talk with your provider or pharmacist if you have any questions about the medicine..    Don't use nasal rinses or irrigation during an acute sinus infection, unless your healthcare provider tells you to. Rinsing may spread the infection to other areas in your  sinuses.    Use acetaminophen or ibuprofen to control pain, unless another pain medicine was prescribed to you. If you have chronic liver or kidney disease or ever had a stomach ulcer, talk with your healthcare provider before using these medicines. Never give aspirin to anyone under age 18 who is ill with a fever. It may cause severe liver damage.    Don't smoke. This can make symptoms worse.    Follow-up care  Follow up with your healthcare provider, or as advised.   When to seek medical advice  Call your healthcare provider if any of these occur:     Facial pain or headache that gets worse    Stiff neck    Unusual drowsiness or confusion    Swelling of your forehead or eyelids    Symptoms don't go away in 10 days    Vision problems, such as blurred or double vision    Fever of 100.4 F (38 C) or higher, or as directed by your healthcare provider  Call 911  Call 911 if any of these occur:     Seizure    Trouble breathing    Feeling dizzy or faint    Fingernails, skin or lips look blue, purple , or gray  Prevention  Here are steps you can take to help prevent an infection:     Keep good hand washing habits.    Don t have close contact with people who have sore throats, colds, or other upper respiratory infections.    Don t smoke, and stay away from secondhand smoke.    Stay up to date with of your vaccines.  [employer] last reviewed this educational content on 12/1/2019 2000-2021 [employer]. All rights reserved. This information is not intended as a substitute for professional medical care. Always follow your healthcare professional's instructions.           Patient Education     Acute Bronchitis  Your healthcare provider has told you that you have acute bronchitis. Bronchitis is infection or inflammation of the airways in the lungs (bronchial tubes). Normally, air moves easily in and out of the airways. Bronchitis narrows the airways. This makes it harder for air to flow in and out of the lungs.  This causes symptoms such as shortness of breath, coughing up yellow or green mucus, and wheezing.  Bronchitis can be acute or chronic. Acute means it happens quickly and goes away in a short time. Chronic means a condition lasts a long time and often comes back. Most people with acute bronchitis get better in 1 to 2 weeks.     What causes acute bronchitis?  Acute bronchitis is often caused by a virus such as a cold or the flu. In some cases, it may be caused by bacteria. Certain factors make it more likely for a cold or flu to turn into bronchitis. These include being very young, being elderly, having a heart or lung problem, or having a weak immune system. Cigarette smoking also makes bronchitis more likely.  When bronchitis develops, the airways become swollen. The airways may also become infected with bacteria. This is known as a secondary infection.  Symptoms of acute bronchitis  Symptoms can include:    Coughing with mucus    Wheezing    Feeling short of breath    Chest pain    Fever  Diagnosing acute bronchitis  Your healthcare provider will ask about your symptoms and health history. He or she will give you a physical exam. This will include listening to your lungs while you breathe. You may have a chest X-ray to look for infection in the lungs (pneumonia) if you have had a fever. You may also have blood tests to check for infection.  Treating acute bronchitis  Bronchitis usually goes away in 1 to 2 weeks without treatment. You can help feel better by:    Taking medicine as directed. Talk to your healthcare provider before taking any over-the-counter medicines (OTC). Some OTC medicines help relieve inflammation in your bronchial tubes. They can also thin mucus. This makes it easier to cough up. Your healthcare provider may prescribe an inhaler to help open up the bronchial tubes. Most of the time, acute bronchitis is caused by a viral infection. Antibiotics are usually not prescribed for viral  infections.    Drinking plenty of fluids, such as water, juice, or warm soup. Fluids loosen mucus so that you can cough it up. This helps you breathe more easily. Fluids also prevent dehydration.    Using a humidifier. This can help reduce coughing.    Getting plenty of rest    Not smoking. Also, don't let anyone else smoke in your home. In public places, move away from secondhand smoke.  Recovery and follow-up  Follow up with your doctor. You will likely feel better in 1 to 2 weeks. But you may have a dry cough for a longer time. Let your doctor know if you still have symptoms other than a dry cough after 2 weeks. Tell him or her if you get bronchial infections often.  Self-care tips  To get relief from your symptoms and prevent bronchitis:    Stop smoking. Stopping smoking is the most important step you can take to treat bronchitis. If you need help stopping smoking, talk with your healthcare provider.    Stay away from secondhand smoke and other irritants. Try to stay away from smoke, chemicals, fumes, and dust. Don t let anyone smoke in your home. Stay indoors on smoggy days.    Prevent lung infections. Ask your healthcare provider about the flu and pneumonia vaccines. Take steps to prevent colds and other lung infections.    Wash your hands well. Wash your hands often with soap and water. Use hand  when you can t wash your hands. Stay away from crowds during cold and flu season.  When to call your healthcare provider  Call the healthcare provider if you have any of these:    Fever of 100.4 F ( 38.0 C) or higher, or as directed by your healthcare provider    Symptoms that get worse, or new symptoms    Breathing not getting better with treatments    Symptoms that don t start to get better in 1 week  Gloria last reviewed this educational content on 8/1/2019 2000-2021 [employer]. All rights reserved. This information is not intended as a substitute for professional medical care. Always  follow your healthcare professional's instructions.

## 2021-05-02 NOTE — PROGRESS NOTES
SUBJECTIVE:   Ibis Lazar is a 21 year old female presenting with a chief complaint of sore throat, cough, fever, bodyaches, headaches.  Onset of symptoms was 1 week(s) ago.  Course of illness is worsening.    Severity moderate  Current and Associated symptoms: sore throat, cough, chest pain, fever, body aches  Treatment measures tried include Tylenol/Ibuprofen, Fluids and Rest.  Predisposing factors include None.    Seen in  4/28 with sore throat, strep negative, COVID screen obtained, negative.    Initial symptoms was sore throat, has mild symptoms but got worse.  Developed fever 101, headache, body aches, cough.  Sore throat improving but sinus worsen.  Coughing will cause more chest pain and will feel a little SOB.    Currently vaping.  Had mono about 4-5 years ago.    No past medical history on file.  Current Outpatient Medications   Medication Sig Dispense Refill     acetaminophen (TYLENOL) 500 MG tablet Take 500-1,000 mg by mouth every 6 hours as needed for mild pain       ibuprofen (ADVIL/MOTRIN) 200 MG capsule Take 200 mg by mouth every 4 hours as needed for fever       Social History     Tobacco Use     Smoking status: Current Every Day Smoker     Types: Other     Smokeless tobacco: Never Used   Substance Use Topics     Alcohol use: Yes     Comment: rare       ROS:  Review of systems negative except as stated above.    OBJECTIVE:  /87   Pulse 93   Temp 98.2  F (36.8  C)   Wt 56.7 kg (125 lb)   SpO2 98%   BMI 19.58 kg/m    GENERAL APPEARANCE: healthy, alert and no distress  EYES: EOMI,  PERRL, conjunctiva clear  RESP: lungs clear to auscultation - no rales, rhonchi or wheezes  CV: regular rates and rhythm, normal S1 S2, no murmur noted  PSYCH: mentation appears normal and affect normal/bright    CXR - no acute infiltrate, no pleural effusion, no pneumothorax personally viewed by me    Results for orders placed or performed in visit on 05/02/21   XR Chest 2 Views     Status: None     Narrative    CHEST TWO VIEWS  5/2/2021 4:34 PM     HISTORY:  Cough, fever, and shortness of breath.    COMPARISON: 3/22/2019.      Impression    IMPRESSION: Negative chest. Lungs clear.    NAHOMY VALDES MD   ESR: Erythrocyte sedimentation rate     Status: None   Result Value Ref Range    Sed Rate 6 0 - 20 mm/h   CBC with platelets and differential     Status: None   Result Value Ref Range    WBC 7.7 4.0 - 11.0 10e9/L    RBC Count 4.75 3.8 - 5.2 10e12/L    Hemoglobin 13.8 11.7 - 15.7 g/dL    Hematocrit 42.1 35.0 - 47.0 %    MCV 89 78 - 100 fl    MCH 29.1 26.5 - 33.0 pg    MCHC 32.8 31.5 - 36.5 g/dL    RDW 12.7 10.0 - 15.0 %    Platelet Count 196 150 - 450 10e9/L    Diff Method Automated Method     % Neutrophils 56.6 %    % Lymphocytes 35.3 %    % Monocytes 6.3 %    % Eosinophils 1.4 %    % Basophils 0.4 %    Absolute Neutrophil 4.4 1.6 - 8.3 10e9/L    Absolute Lymphocytes 2.7 0.8 - 5.3 10e9/L    Absolute Monocytes 0.5 0.0 - 1.3 10e9/L    Absolute Eosinophils 0.1 0.0 - 0.7 10e9/L    Absolute Basophils 0.0 0.0 - 0.2 10e9/L       ASSESSMENT/PLAN:  (R05) Cough  (primary encounter diagnosis)  Plan: XR Chest 2 Views, ESR: Erythrocyte         sedimentation rate, CBC with platelets and         differential, benzonatate (TESSALON) 200 MG         capsule, guaiFENesin-codeine (ROBITUSSIN AC)         100-10 MG/5ML solution, albuterol (PROAIR         HFA/PROVENTIL HFA/VENTOLIN HFA) 108 (90 Base)         MCG/ACT inhaler            (Z20.822) Suspected COVID-19 virus infection  Plan: benzonatate (TESSALON) 200 MG capsule,         guaiFENesin-codeine (ROBITUSSIN AC) 100-10         MG/5ML solution, albuterol (PROAIR         HFA/PROVENTIL HFA/VENTOLIN HFA) 108 (90 Base)         MCG/ACT inhaler            (J01.90) Acute sinusitis with coexisting condition requiring prophylactic treatment  Plan: doxycycline hyclate (VIBRAMYCIN) 100 MG capsule            Reassurance given, discussed labs and CXR that was obtained, reviewed symptomatic  treatment with tylenol, ibuprofen, plenty of fluids and rest.  Discussed that symptoms presentation is most likely COVID infection even with negative COVID screen and needs to quarantine for 10 days.  RX tessalon perles and RX maddie AC given to help with cough, RX albuterol inhaler given to help with cough, SOB and wheezing symptoms.  Discussed bronchitis and worsening sinus symptoms, empiric coverage with RX doxycycline given to cover for bacterial etiology for sinus infection and lung coverage for bronchitis.    Work excuse note given  Follow up with primary provider if no improvement of symptoms in 1 week    Leandro Truong MD,  May 2, 2021 5:37 PM

## 2021-10-10 ENCOUNTER — HEALTH MAINTENANCE LETTER (OUTPATIENT)
Age: 21
End: 2021-10-10

## 2022-05-21 ENCOUNTER — HEALTH MAINTENANCE LETTER (OUTPATIENT)
Age: 22
End: 2022-05-21

## 2022-09-18 ENCOUNTER — HEALTH MAINTENANCE LETTER (OUTPATIENT)
Age: 22
End: 2022-09-18

## 2022-11-23 ENCOUNTER — OFFICE VISIT (OUTPATIENT)
Dept: URGENT CARE | Facility: URGENT CARE | Age: 22
End: 2022-11-23
Payer: COMMERCIAL

## 2022-11-23 VITALS
TEMPERATURE: 98.6 F | SYSTOLIC BLOOD PRESSURE: 106 MMHG | HEART RATE: 83 BPM | DIASTOLIC BLOOD PRESSURE: 70 MMHG | OXYGEN SATURATION: 96 %

## 2022-11-23 DIAGNOSIS — J02.9 SORE THROAT: Primary | ICD-10-CM

## 2022-11-23 LAB
DEPRECATED S PYO AG THROAT QL EIA: NEGATIVE
GROUP A STREP BY PCR: NOT DETECTED

## 2022-11-23 PROCEDURE — 87651 STREP A DNA AMP PROBE: CPT | Performed by: PHYSICIAN ASSISTANT

## 2022-11-23 PROCEDURE — 99213 OFFICE O/P EST LOW 20 MIN: CPT | Performed by: PHYSICIAN ASSISTANT

## 2022-11-23 RX ORDER — PREDNISONE 20 MG/1
20 TABLET ORAL DAILY
Qty: 3 TABLET | Refills: 0 | Status: SHIPPED | OUTPATIENT
Start: 2022-11-23 | End: 2022-11-26

## 2022-11-23 NOTE — PATIENT INSTRUCTIONS
I suspect you have influenza-like illness, your strep test is negative.  I am going to give you a steroid called prednisone, which will help with the swelling in your neck and throat.  Lots of fluids, rest, salt water gargles and humidified air at night.  Tylenol/ibuprofen for comfort.

## 2022-11-23 NOTE — PROGRESS NOTES
Assessment & Plan     1. Sore throat  Suspect viral etiology.  No evidence of PTA, RPA, Neil's angina, deep neck space abscess etc.  Home COVID test was negative.  Rapid strep test negative, PCR pending  Given her lymphadenopathy and tonsillar swelling, Rx for prednisone was sent to the pharmacy.  Low suspicion for mono, she has had mono in the past. Symptoms only present for 3-4 days, too early to test.   - Streptococcus A Rapid Screen w/Reflex to PCR  - Group A Streptococcus PCR Throat Swab  - predniSONE (DELTASONE) 20 MG tablet; Take 1 tablet (20 mg) by mouth daily for 3 days  Dispense: 3 tablet; Refill: 0      Return in about 5 days (around 11/28/2022), or if symptoms worsen or fail to improve.    Diagnosis and treatment plan was reviewed with patient and/or family.   We went over any labs or imaging. Discussed worsening symptoms or little to no relief despite treatment plan to follow-up with PCP or return to clinic.  Patient verbalizes understanding. All questions were addressed and answered.     No name on file  [facility]    CHIEF COMPLAINT:   Chief Complaint   Patient presents with     Pharyngitis     Poss strep sx X4 days fever on and off      Subjective     Ibis is a 22 year old female who presents to clinic today for evaluation of sore throat.  Symptoms started 3 to 4 days ago, at that time she had fever, fever since resolved.  Pain is worse with swallowing.  Has had some runny nose and cough.  Coworkers sick with similar symptoms.      No past medical history on file.  No past surgical history on file.  Social History     Tobacco Use     Smoking status: Every Day     Types: Other     Smokeless tobacco: Never   Substance Use Topics     Alcohol use: Yes     Comment: rare     Current Outpatient Medications   Medication     predniSONE (DELTASONE) 20 MG tablet     acetaminophen (TYLENOL) 500 MG tablet     albuterol (PROAIR HFA/PROVENTIL HFA/VENTOLIN HFA) 108 (90 Base) MCG/ACT  inhaler     benzonatate (TESSALON) 200 MG capsule     guaiFENesin-codeine (ROBITUSSIN AC) 100-10 MG/5ML solution     ibuprofen (ADVIL/MOTRIN) 200 MG capsule     No current facility-administered medications for this visit.     No Known Allergies    10 point ROS of systems were all negative except for pertinent positives noted in my HPI.      Exam:   /70   Pulse 83   Temp 98.6  F (37  C)   SpO2 96%   Constitutional: healthy, alert and no distress  Head: Normocephalic, atraumatic.  Eyes: conjunctiva clear, no drainage  ENT: TMs clear and shiny nolan, nasal mucosa pink and moist, throat is erythematous. No trismus or drooling  Neck: neck is supple, + anterior cervical lymphadenopathy or nuchal rigidity  Cardiovascular: RRR  Respiratory: CTA bilaterally, no rhonchi or rales  Skin: no rashes  Neurologic: Speech clear, gait normal. Moves all extremities.    Results for orders placed or performed in visit on 11/23/22   Streptococcus A Rapid Screen w/Reflex to PCR     Status: Normal    Specimen: Throat; Swab   Result Value Ref Range    Group A Strep antigen Negative Negative

## 2023-06-04 ENCOUNTER — HEALTH MAINTENANCE LETTER (OUTPATIENT)
Age: 23
End: 2023-06-04

## 2024-07-14 ENCOUNTER — HEALTH MAINTENANCE LETTER (OUTPATIENT)
Age: 24
End: 2024-07-14

## 2025-07-19 ENCOUNTER — HEALTH MAINTENANCE LETTER (OUTPATIENT)
Age: 25
End: 2025-07-19